# Patient Record
Sex: FEMALE | Race: WHITE | NOT HISPANIC OR LATINO | Employment: FULL TIME | URBAN - METROPOLITAN AREA
[De-identification: names, ages, dates, MRNs, and addresses within clinical notes are randomized per-mention and may not be internally consistent; named-entity substitution may affect disease eponyms.]

---

## 2020-05-12 ENCOUNTER — HOSPITAL ENCOUNTER (OUTPATIENT)
Dept: CT IMAGING | Age: 35
Discharge: HOME | End: 2020-05-12
Attending: OTOLARYNGOLOGY

## 2020-05-12 DIAGNOSIS — J32.0 CHRONIC MAXILLARY SINUSITIS: ICD-10-CM

## 2020-05-12 PROCEDURE — 70486 CT MAXILLOFACIAL W/O DYE: CPT

## 2020-05-22 ENCOUNTER — OFFICE VISIT (OUTPATIENT)
Dept: SLEEP MEDICINE | Age: 35
End: 2020-05-22

## 2020-05-22 DIAGNOSIS — G47.33 OSA (OBSTRUCTIVE SLEEP APNEA): ICD-10-CM

## 2020-05-22 PROCEDURE — 95806 SLEEP STUDY UNATT&RESP EFFT: CPT

## 2020-07-23 DIAGNOSIS — J32.0 CHRONIC MAXILLARY SINUSITIS: Primary | ICD-10-CM

## 2020-08-07 RX ORDER — NORGESTIMATE AND ETHINYL ESTRADIOL 7DAYSX3 LO
1 KIT ORAL DAILY
COMMUNITY

## 2020-08-07 ASSESSMENT — ACTIVITIES OF DAILY LIVING (ADL)
ADL_SCORE: 12
ADL_BEFORE_ADMISSION: INDEPENDENT

## 2020-08-09 ENCOUNTER — LAB SERVICES (OUTPATIENT)
Dept: LAB | Age: 35
End: 2020-08-09

## 2020-08-09 DIAGNOSIS — J32.0 CHRONIC MAXILLARY SINUSITIS: ICD-10-CM

## 2020-08-09 PROCEDURE — 87635 SARS-COV-2 COVID-19 AMP PRB: CPT | Performed by: FAMILY MEDICINE

## 2020-08-10 ENCOUNTER — ANESTHESIA EVENT (OUTPATIENT)
Dept: SURGERY | Age: 35
End: 2020-08-10

## 2020-08-10 LAB
SARS-COV-2 RNA RESP QL NAA+PROBE: NOT DETECTED
SERVICE CMNT-IMP: NORMAL
SPECIMEN SOURCE: NORMAL

## 2020-08-11 ENCOUNTER — ANESTHESIA (OUTPATIENT)
Dept: SURGERY | Age: 35
End: 2020-08-11

## 2020-08-11 ENCOUNTER — HOSPITAL ENCOUNTER (OUTPATIENT)
Age: 35
Discharge: HOME | End: 2020-08-11
Attending: OTOLARYNGOLOGY | Admitting: OTOLARYNGOLOGY

## 2020-08-11 VITALS
DIASTOLIC BLOOD PRESSURE: 80 MMHG | OXYGEN SATURATION: 98 % | HEART RATE: 70 BPM | RESPIRATION RATE: 18 BRPM | TEMPERATURE: 98.4 F | BODY MASS INDEX: 41.41 KG/M2 | HEIGHT: 63 IN | SYSTOLIC BLOOD PRESSURE: 151 MMHG | WEIGHT: 233.69 LBS

## 2020-08-11 LAB
B-HCG UR QL: NEGATIVE
GLUCOSE BLDC GLUCOMTR-MCNC: 152 MG/DL (ref 70–99)

## 2020-08-11 PROCEDURE — 13000002 HB ANESTHESIA  GENERAL  S/U + 1ST 15 MIN: Performed by: OTOLARYNGOLOGY

## 2020-08-11 PROCEDURE — 10004651 HB RX, NO CHARGE ITEM: Performed by: OTOLARYNGOLOGY

## 2020-08-11 PROCEDURE — 13000003 HB ANESTHESIA  GENERAL EA ADD MINUTE: Performed by: OTOLARYNGOLOGY

## 2020-08-11 PROCEDURE — 10004451 HB PACU RECOVERY 1ST 30 MINUTES: Performed by: OTOLARYNGOLOGY

## 2020-08-11 PROCEDURE — 10002803 HB RX 637

## 2020-08-11 PROCEDURE — 13000001 HB PHASE II RECOVERY EA 30 MINUTES: Performed by: OTOLARYNGOLOGY

## 2020-08-11 PROCEDURE — 81025 URINE PREGNANCY TEST: CPT

## 2020-08-11 PROCEDURE — 10006023 HB SUPPLY 272: Performed by: OTOLARYNGOLOGY

## 2020-08-11 PROCEDURE — 10002800 HB RX 250 W HCPCS: Performed by: ANESTHESIOLOGY

## 2020-08-11 PROCEDURE — 10002803 HB RX 637: Performed by: OTOLARYNGOLOGY

## 2020-08-11 PROCEDURE — 10002801 HB RX 250 W/O HCPCS: Performed by: ANESTHESIOLOGY

## 2020-08-11 PROCEDURE — 10002800 HB RX 250 W HCPCS: Performed by: OTOLARYNGOLOGY

## 2020-08-11 PROCEDURE — 88305 TISSUE EXAM BY PATHOLOGIST: CPT

## 2020-08-11 PROCEDURE — 88304 TISSUE EXAM BY PATHOLOGIST: CPT

## 2020-08-11 PROCEDURE — 10002807 HB RX 258

## 2020-08-11 PROCEDURE — 82962 GLUCOSE BLOOD TEST: CPT

## 2020-08-11 PROCEDURE — 13000037 HB COMPLEX CASE EACH ADD MINUTE: Performed by: OTOLARYNGOLOGY

## 2020-08-11 PROCEDURE — 13000036 HB COMPLEX  CASE S/U + 1ST 15 MIN: Performed by: OTOLARYNGOLOGY

## 2020-08-11 PROCEDURE — 10004452 HB PACU ADDL 30 MINUTES: Performed by: OTOLARYNGOLOGY

## 2020-08-11 PROCEDURE — 10002801 HB RX 250 W/O HCPCS: Performed by: OTOLARYNGOLOGY

## 2020-08-11 RX ORDER — MIDAZOLAM HYDROCHLORIDE 1 MG/ML
2 INJECTION, SOLUTION INTRAMUSCULAR; INTRAVENOUS ONCE
Status: DISCONTINUED | OUTPATIENT
Start: 2020-08-11 | End: 2020-08-11 | Stop reason: HOSPADM

## 2020-08-11 RX ORDER — MIDAZOLAM HYDROCHLORIDE 1 MG/ML
1 INJECTION, SOLUTION INTRAMUSCULAR; INTRAVENOUS ONCE
Status: COMPLETED | OUTPATIENT
Start: 2020-08-11 | End: 2020-08-11

## 2020-08-11 RX ORDER — CHLORHEXIDINE GLUCONATE ORAL RINSE 1.2 MG/ML
15 SOLUTION DENTAL
Status: COMPLETED | OUTPATIENT
Start: 2020-08-11 | End: 2020-08-11

## 2020-08-11 RX ORDER — ROCURONIUM BROMIDE 10 MG/ML
INJECTION, SOLUTION INTRAVENOUS PRN
Status: DISCONTINUED | OUTPATIENT
Start: 2020-08-11 | End: 2020-08-11

## 2020-08-11 RX ORDER — DEXTROSE MONOHYDRATE 25 G/50ML
25 INJECTION, SOLUTION INTRAVENOUS PRN
Status: DISCONTINUED | OUTPATIENT
Start: 2020-08-11 | End: 2020-08-11 | Stop reason: HOSPADM

## 2020-08-11 RX ORDER — 0.9 % SODIUM CHLORIDE 0.9 %
2 VIAL (ML) INJECTION EVERY 12 HOURS SCHEDULED
Status: DISCONTINUED | OUTPATIENT
Start: 2020-08-11 | End: 2020-08-11 | Stop reason: HOSPADM

## 2020-08-11 RX ORDER — PROPOFOL 10 MG/ML
INJECTION, EMULSION INTRAVENOUS PRN
Status: DISCONTINUED | OUTPATIENT
Start: 2020-08-11 | End: 2020-08-11

## 2020-08-11 RX ORDER — METOCLOPRAMIDE HYDROCHLORIDE 5 MG/ML
5 INJECTION INTRAMUSCULAR; INTRAVENOUS EVERY 6 HOURS PRN
Status: DISCONTINUED | OUTPATIENT
Start: 2020-08-11 | End: 2020-08-11 | Stop reason: HOSPADM

## 2020-08-11 RX ORDER — HUMAN INSULIN 100 [IU]/ML
INJECTION, SOLUTION SUBCUTANEOUS
Status: DISCONTINUED | OUTPATIENT
Start: 2020-08-11 | End: 2020-08-11 | Stop reason: HOSPADM

## 2020-08-11 RX ORDER — SODIUM CHLORIDE, SODIUM LACTATE, POTASSIUM CHLORIDE, CALCIUM CHLORIDE 600; 310; 30; 20 MG/100ML; MG/100ML; MG/100ML; MG/100ML
INJECTION, SOLUTION INTRAVENOUS CONTINUOUS
Status: DISCONTINUED | OUTPATIENT
Start: 2020-08-11 | End: 2020-08-11 | Stop reason: HOSPADM

## 2020-08-11 RX ORDER — GLYCOPYRROLATE 0.2 MG/ML
INJECTION, SOLUTION INTRAMUSCULAR; INTRAVENOUS PRN
Status: DISCONTINUED | OUTPATIENT
Start: 2020-08-11 | End: 2020-08-11

## 2020-08-11 RX ORDER — FAMOTIDINE 20 MG/1
20 TABLET, FILM COATED ORAL
Status: COMPLETED | OUTPATIENT
Start: 2020-08-11 | End: 2020-08-11

## 2020-08-11 RX ORDER — GABAPENTIN 300 MG/1
600 CAPSULE ORAL
Status: COMPLETED | OUTPATIENT
Start: 2020-08-11 | End: 2020-08-11

## 2020-08-11 RX ORDER — LIDOCAINE HYDROCHLORIDE 10 MG/ML
5-10 INJECTION, SOLUTION INFILTRATION; PERINEURAL PRN
Status: DISCONTINUED | OUTPATIENT
Start: 2020-08-11 | End: 2020-08-11 | Stop reason: HOSPADM

## 2020-08-11 RX ORDER — ONDANSETRON 2 MG/ML
INJECTION INTRAMUSCULAR; INTRAVENOUS PRN
Status: DISCONTINUED | OUTPATIENT
Start: 2020-08-11 | End: 2020-08-11

## 2020-08-11 RX ORDER — OXYMETAZOLINE HYDROCHLORIDE 0.05 G/100ML
SPRAY NASAL PRN
Status: DISCONTINUED | OUTPATIENT
Start: 2020-08-11 | End: 2020-08-11 | Stop reason: HOSPADM

## 2020-08-11 RX ORDER — HYDROCODONE BITARTRATE AND ACETAMINOPHEN 7.5; 325 MG/1; MG/1
1 TABLET ORAL EVERY 6 HOURS PRN
Status: DISCONTINUED | OUTPATIENT
Start: 2020-08-11 | End: 2020-08-11 | Stop reason: HOSPADM

## 2020-08-11 RX ORDER — NEOSTIGMINE METHYLSULFATE 1 MG/ML
INJECTION, SOLUTION INTRAVENOUS PRN
Status: DISCONTINUED | OUTPATIENT
Start: 2020-08-11 | End: 2020-08-11

## 2020-08-11 RX ORDER — LIDOCAINE HYDROCHLORIDE AND EPINEPHRINE 10; 10 MG/ML; UG/ML
INJECTION, SOLUTION INFILTRATION; PERINEURAL PRN
Status: DISCONTINUED | OUTPATIENT
Start: 2020-08-11 | End: 2020-08-11 | Stop reason: HOSPADM

## 2020-08-11 RX ORDER — MIDAZOLAM HYDROCHLORIDE 2 MG/2ML
INJECTION, SOLUTION INTRAMUSCULAR; INTRAVENOUS
Status: DISCONTINUED
Start: 2020-08-11 | End: 2020-08-11 | Stop reason: HOSPADM

## 2020-08-11 RX ORDER — DEXAMETHASONE SODIUM PHOSPHATE 4 MG/ML
INJECTION, SOLUTION INTRA-ARTICULAR; INTRALESIONAL; INTRAMUSCULAR; INTRAVENOUS; SOFT TISSUE PRN
Status: DISCONTINUED | OUTPATIENT
Start: 2020-08-11 | End: 2020-08-11

## 2020-08-11 RX ORDER — ONDANSETRON 2 MG/ML
4 INJECTION INTRAMUSCULAR; INTRAVENOUS 2 TIMES DAILY PRN
Status: DISCONTINUED | OUTPATIENT
Start: 2020-08-11 | End: 2020-08-11 | Stop reason: HOSPADM

## 2020-08-11 RX ORDER — ACETAMINOPHEN 500 MG
1000 TABLET ORAL
Status: COMPLETED | OUTPATIENT
Start: 2020-08-11 | End: 2020-08-11

## 2020-08-11 RX ORDER — HYDRALAZINE HYDROCHLORIDE 20 MG/ML
5 INJECTION INTRAMUSCULAR; INTRAVENOUS EVERY 10 MIN PRN
Status: DISCONTINUED | OUTPATIENT
Start: 2020-08-11 | End: 2020-08-11 | Stop reason: HOSPADM

## 2020-08-11 RX ORDER — MIDAZOLAM HYDROCHLORIDE 1 MG/ML
INJECTION, SOLUTION INTRAMUSCULAR; INTRAVENOUS PRN
Status: DISCONTINUED | OUTPATIENT
Start: 2020-08-11 | End: 2020-08-11

## 2020-08-11 RX ADMIN — ONDANSETRON HYDROCHLORIDE 4 MG: 2 SOLUTION INTRAMUSCULAR; INTRAVENOUS at 10:10

## 2020-08-11 RX ADMIN — LABETALOL HYDROCHLORIDE 5 MG: 5 INJECTION, SOLUTION INTRAVENOUS at 08:08

## 2020-08-11 RX ADMIN — MIDAZOLAM HYDROCHLORIDE 2 MG: 1 INJECTION, SOLUTION INTRAMUSCULAR; INTRAVENOUS at 07:38

## 2020-08-11 RX ADMIN — GLYCOPYRROLATE 0.6 MG: 0.2 INJECTION, SOLUTION INTRAMUSCULAR; INTRAVENOUS at 09:02

## 2020-08-11 RX ADMIN — CHLORHEXIDINE GLUCONATE 15 ML: 1.2 RINSE ORAL at 07:31

## 2020-08-11 RX ADMIN — FAMOTIDINE 20 MG: 20 TABLET ORAL at 07:22

## 2020-08-11 RX ADMIN — HYDROMORPHONE HYDROCHLORIDE 1 MG: 1 INJECTION, SOLUTION INTRAMUSCULAR; INTRAVENOUS; SUBCUTANEOUS at 08:08

## 2020-08-11 RX ADMIN — ONDANSETRON 4 MG: 2 INJECTION INTRAMUSCULAR; INTRAVENOUS at 07:56

## 2020-08-11 RX ADMIN — ACETAMINOPHEN 1000 MG: 500 TABLET ORAL at 07:22

## 2020-08-11 RX ADMIN — GABAPENTIN 600 MG: 300 CAPSULE ORAL at 07:22

## 2020-08-11 RX ADMIN — CEFAZOLIN SODIUM 2000 MG: 300 INJECTION, POWDER, LYOPHILIZED, FOR SOLUTION INTRAVENOUS at 07:53

## 2020-08-11 RX ADMIN — PROPOFOL 200 MG: 10 INJECTION, EMULSION INTRAVENOUS at 07:44

## 2020-08-11 RX ADMIN — SODIUM CHLORIDE, SODIUM LACTATE, POTASSIUM CHLORIDE, AND CALCIUM CHLORIDE: .6; .31; .03; .02 INJECTION, SOLUTION INTRAVENOUS at 07:22

## 2020-08-11 RX ADMIN — DEXAMETHASONE SODIUM PHOSPHATE 10 MG: 4 INJECTION, SOLUTION INTRAMUSCULAR; INTRAVENOUS at 07:56

## 2020-08-11 RX ADMIN — ROCURONIUM BROMIDE 50 MG: 50 INJECTION, SOLUTION INTRAVENOUS at 07:44

## 2020-08-11 RX ADMIN — FENTANYL CITRATE 100 MCG: 50 INJECTION INTRAMUSCULAR; INTRAVENOUS at 07:38

## 2020-08-11 RX ADMIN — NEOSTIGMINE METHYLSULFATE 3 MG: 1 INJECTION INTRAVENOUS at 09:02

## 2020-08-11 RX ADMIN — MIDAZOLAM HYDROCHLORIDE 1 MG: 1 INJECTION, SOLUTION INTRAMUSCULAR; INTRAVENOUS at 09:20

## 2020-08-11 ASSESSMENT — PAIN SCALES - GENERAL
PAINLEVEL_OUTOF10: 0
PAINLEVEL_OUTOF10: 4
PAINLEVEL_OUTOF10: 3

## 2020-08-13 LAB — PATHOLOGIST NAME: NORMAL

## 2020-12-07 ENCOUNTER — HOSPITAL ENCOUNTER (OUTPATIENT)
Dept: LAB | Age: 35
Discharge: HOME | End: 2020-12-07
Attending: OTOLARYNGOLOGY

## 2020-12-07 DIAGNOSIS — R43.8 HYPOSMIA: Primary | ICD-10-CM

## 2020-12-07 PROCEDURE — 86769 SARS-COV-2 COVID-19 ANTIBODY: CPT | Performed by: OTOLARYNGOLOGY

## 2020-12-07 PROCEDURE — 36415 COLL VENOUS BLD VENIPUNCTURE: CPT | Performed by: OTOLARYNGOLOGY

## 2020-12-08 LAB
SARS-COV-2 IGG SERPL QL IA: POSITIVE
SARS-COV-2 N IGG SERPL QL IA: 3.69

## 2022-01-14 ENCOUNTER — HOSPITAL ENCOUNTER (OUTPATIENT)
Dept: GENERAL RADIOLOGY | Age: 37
Discharge: HOME | End: 2022-01-14

## 2022-01-14 DIAGNOSIS — R05.9 COUGH: ICD-10-CM

## 2022-01-14 PROCEDURE — 71046 X-RAY EXAM CHEST 2 VIEWS: CPT

## 2022-08-24 ENCOUNTER — HOSPITAL ENCOUNTER (OUTPATIENT)
Dept: CT IMAGING | Age: 37
Discharge: HOME | End: 2022-08-24
Attending: OTOLARYNGOLOGY

## 2022-08-24 DIAGNOSIS — M54.2 NECK PAIN ON LEFT SIDE: ICD-10-CM

## 2022-08-24 PROCEDURE — 10002805 HB CONTRAST AGENT: Performed by: OTOLARYNGOLOGY

## 2022-08-24 PROCEDURE — 70492 CT SFT TSUE NCK W/O & W/DYE: CPT

## 2022-08-24 RX ADMIN — IOHEXOL 100 ML: 350 INJECTION, SOLUTION INTRAVENOUS at 08:51

## 2022-09-12 ENCOUNTER — OFFICE VISIT (OUTPATIENT)
Dept: INTERNAL MEDICINE CLINIC | Facility: CLINIC | Age: 37
End: 2022-09-12
Payer: COMMERCIAL

## 2022-09-12 VITALS
BODY MASS INDEX: 43.94 KG/M2 | SYSTOLIC BLOOD PRESSURE: 120 MMHG | DIASTOLIC BLOOD PRESSURE: 80 MMHG | WEIGHT: 248 LBS | HEIGHT: 63 IN

## 2022-09-12 DIAGNOSIS — B37.3 YEAST INFECTION INVOLVING THE VAGINA AND SURROUNDING AREA: ICD-10-CM

## 2022-09-12 DIAGNOSIS — Z00.00 WELLNESS EXAMINATION: Primary | ICD-10-CM

## 2022-09-12 PROCEDURE — 3079F DIAST BP 80-89 MM HG: CPT | Performed by: INTERNAL MEDICINE

## 2022-09-12 PROCEDURE — 3008F BODY MASS INDEX DOCD: CPT | Performed by: INTERNAL MEDICINE

## 2022-09-12 PROCEDURE — 99385 PREV VISIT NEW AGE 18-39: CPT | Performed by: INTERNAL MEDICINE

## 2022-09-12 PROCEDURE — 3074F SYST BP LT 130 MM HG: CPT | Performed by: INTERNAL MEDICINE

## 2022-09-12 RX ORDER — NORGESTIMATE AND ETHINYL ESTRADIOL 7DAYSX3 LO
1 KIT ORAL DAILY
COMMUNITY

## 2022-09-12 RX ORDER — NORETHINDRONE ACETATE AND ETHINYL ESTRADIOL, ETHINYL ESTRADIOL AND FERROUS FUMARATE 1MG-10(24)
1 KIT ORAL DAILY
COMMUNITY
Start: 2022-07-14

## 2022-10-22 ENCOUNTER — NURSE ONLY (OUTPATIENT)
Dept: INTERNAL MEDICINE CLINIC | Facility: CLINIC | Age: 37
End: 2022-10-22
Payer: COMMERCIAL

## 2022-10-23 LAB
ABSOLUTE BASOPHILS: 41 CELLS/UL (ref 0–200)
ABSOLUTE EOSINOPHILS: 41 CELLS/UL (ref 15–500)
ABSOLUTE LYMPHOCYTES: 1949 CELLS/UL (ref 850–3900)
ABSOLUTE MONOCYTES: 325 CELLS/UL (ref 200–950)
ABSOLUTE NEUTROPHILS: 3445 CELLS/UL (ref 1500–7800)
ALBUMIN/GLOBULIN RATIO: 1.4 (CALC) (ref 1–2.5)
ALBUMIN: 4 G/DL (ref 3.6–5.1)
ALKALINE PHOSPHATASE: 56 U/L (ref 31–125)
ALT: 68 U/L (ref 6–29)
AST: 39 U/L (ref 10–30)
BASOPHILS: 0.7 %
BILIRUBIN, TOTAL: 0.2 MG/DL (ref 0.2–1.2)
BUN: 14 MG/DL (ref 7–25)
CALCIUM: 9.3 MG/DL (ref 8.6–10.2)
CARBON DIOXIDE: 22 MMOL/L (ref 20–32)
CHLORIDE: 107 MMOL/L (ref 98–110)
CHOL/HDLC RATIO: 3.9 (CALC)
CHOLESTEROL, TOTAL: 210 MG/DL
CREATININE: 0.62 MG/DL (ref 0.5–0.97)
EGFR: 118 ML/MIN/1.73M2
EOSINOPHILS: 0.7 %
GLOBULIN: 2.9 G/DL (CALC) (ref 1.9–3.7)
GLUCOSE: 134 MG/DL (ref 65–99)
HDL CHOLESTEROL: 54 MG/DL
HEMATOCRIT: 44 % (ref 35–45)
HEMOGLOBIN A1C: 5.5 % OF TOTAL HGB
HEMOGLOBIN: 14.3 G/DL (ref 11.7–15.5)
LDL-CHOLESTEROL: 122 MG/DL (CALC)
LYMPHOCYTES: 33.6 %
MCH: 30 PG (ref 27–33)
MCHC: 32.5 G/DL (ref 32–36)
MCV: 92.4 FL (ref 80–100)
MONOCYTES: 5.6 %
MPV: 9.9 FL (ref 7.5–12.5)
NEUTROPHILS: 59.4 %
NON-HDL CHOLESTEROL: 156 MG/DL (CALC)
PLATELET COUNT: 264 THOUSAND/UL (ref 140–400)
POTASSIUM: 4.4 MMOL/L (ref 3.5–5.3)
PROTEIN, TOTAL: 6.9 G/DL (ref 6.1–8.1)
RDW: 12.6 % (ref 11–15)
RED BLOOD CELL COUNT: 4.76 MILLION/UL (ref 3.8–5.1)
SODIUM: 138 MMOL/L (ref 135–146)
TRIGLYCERIDES: 225 MG/DL
WHITE BLOOD CELL COUNT: 5.8 THOUSAND/UL (ref 3.8–10.8)

## 2022-10-24 NOTE — PROGRESS NOTES
Confirmed  & full name, Pt was drawn today for   LIPID, CBC, A1C, CMP     Tolerated blood draw well.     Maxine Torres

## 2023-04-10 ENCOUNTER — OFFICE VISIT (OUTPATIENT)
Dept: FAMILY MEDICINE CLINIC | Facility: CLINIC | Age: 38
End: 2023-04-10

## 2023-04-10 ENCOUNTER — HOSPITAL ENCOUNTER (EMERGENCY)
Facility: HOSPITAL | Age: 38
Discharge: HOME | End: 2023-04-10
Attending: EMERGENCY MEDICINE
Payer: COMMERCIAL

## 2023-04-10 VITALS
HEIGHT: 63 IN | RESPIRATION RATE: 18 BRPM | TEMPERATURE: 99 F | HEART RATE: 109 BPM | BODY MASS INDEX: 43.59 KG/M2 | DIASTOLIC BLOOD PRESSURE: 100 MMHG | SYSTOLIC BLOOD PRESSURE: 160 MMHG | OXYGEN SATURATION: 99 % | WEIGHT: 246 LBS

## 2023-04-10 VITALS
TEMPERATURE: 98 F | DIASTOLIC BLOOD PRESSURE: 85 MMHG | OXYGEN SATURATION: 98 % | WEIGHT: 246 LBS | HEIGHT: 63 IN | BODY MASS INDEX: 43.59 KG/M2 | HEART RATE: 80 BPM | SYSTOLIC BLOOD PRESSURE: 129 MMHG | RESPIRATION RATE: 25 BRPM

## 2023-04-10 DIAGNOSIS — Z02.9 ADMINISTRATIVE ENCOUNTER: ICD-10-CM

## 2023-04-10 DIAGNOSIS — R03.0 ELEVATED BLOOD PRESSURE READING: ICD-10-CM

## 2023-04-10 DIAGNOSIS — Z01.30 BLOOD PRESSURE CHECK: Primary | ICD-10-CM

## 2023-04-10 DIAGNOSIS — R03.0 ELEVATED BP WITHOUT DIAGNOSIS OF HYPERTENSION: Primary | ICD-10-CM

## 2023-04-10 LAB
ANION GAP SERPL CALC-SCNC: 8 MMOL/L (ref 0–18)
B-HCG UR QL: NEGATIVE
BASOPHILS # BLD AUTO: 0.04 X10(3) UL (ref 0–0.2)
BASOPHILS NFR BLD AUTO: 0.6 %
BUN BLD-MCNC: 10 MG/DL (ref 7–18)
BUN/CREAT SERPL: 13.7 (ref 10–20)
CALCIUM BLD-MCNC: 8.7 MG/DL (ref 8.5–10.1)
CHLORIDE SERPL-SCNC: 112 MMOL/L (ref 98–112)
CO2 SERPL-SCNC: 18 MMOL/L (ref 21–32)
CREAT BLD-MCNC: 0.73 MG/DL
D DIMER PPP FEU-MCNC: <0.27 UG/ML FEU (ref ?–0.5)
DEPRECATED RDW RBC AUTO: 41.8 FL (ref 35.1–46.3)
EOSINOPHIL # BLD AUTO: 0.01 X10(3) UL (ref 0–0.7)
EOSINOPHIL NFR BLD AUTO: 0.1 %
ERYTHROCYTE [DISTWIDTH] IN BLOOD BY AUTOMATED COUNT: 12.8 % (ref 11–15)
GFR SERPLBLD BASED ON 1.73 SQ M-ARVRAT: 108 ML/MIN/1.73M2 (ref 60–?)
GLUCOSE BLD-MCNC: 125 MG/DL (ref 70–99)
HCT VFR BLD AUTO: 44 %
HGB BLD-MCNC: 14.5 G/DL
IMM GRANULOCYTES # BLD AUTO: 0.02 X10(3) UL (ref 0–1)
IMM GRANULOCYTES NFR BLD: 0.3 %
LYMPHOCYTES # BLD AUTO: 1.87 X10(3) UL (ref 1–4)
LYMPHOCYTES NFR BLD AUTO: 26.9 %
MAGNESIUM SERPL-MCNC: 2.2 MG/DL (ref 1.6–2.6)
MCH RBC QN AUTO: 29.2 PG (ref 26–34)
MCHC RBC AUTO-ENTMCNC: 33 G/DL (ref 31–37)
MCV RBC AUTO: 88.5 FL
MONOCYTES # BLD AUTO: 0.37 X10(3) UL (ref 0.1–1)
MONOCYTES NFR BLD AUTO: 5.3 %
NEUTROPHILS # BLD AUTO: 4.65 X10 (3) UL (ref 1.5–7.7)
NEUTROPHILS # BLD AUTO: 4.65 X10(3) UL (ref 1.5–7.7)
NEUTROPHILS NFR BLD AUTO: 66.8 %
OSMOLALITY SERPL CALC.SUM OF ELEC: 287 MOSM/KG (ref 275–295)
PLATELET # BLD AUTO: 284 10(3)UL (ref 150–450)
POTASSIUM SERPL-SCNC: 4.4 MMOL/L (ref 3.5–5.1)
RBC # BLD AUTO: 4.97 X10(6)UL
SODIUM SERPL-SCNC: 138 MMOL/L (ref 136–145)
TROPONIN I HIGH SENSITIVITY: 5 NG/L
WBC # BLD AUTO: 7 X10(3) UL (ref 4–11)

## 2023-04-10 PROCEDURE — 99284 EMERGENCY DEPT VISIT MOD MDM: CPT

## 2023-04-10 PROCEDURE — 85025 COMPLETE CBC W/AUTO DIFF WBC: CPT | Performed by: EMERGENCY MEDICINE

## 2023-04-10 PROCEDURE — 93010 ELECTROCARDIOGRAM REPORT: CPT

## 2023-04-10 PROCEDURE — 80048 BASIC METABOLIC PNL TOTAL CA: CPT

## 2023-04-10 PROCEDURE — 84484 ASSAY OF TROPONIN QUANT: CPT | Performed by: EMERGENCY MEDICINE

## 2023-04-10 PROCEDURE — 84484 ASSAY OF TROPONIN QUANT: CPT

## 2023-04-10 PROCEDURE — 85025 COMPLETE CBC W/AUTO DIFF WBC: CPT

## 2023-04-10 PROCEDURE — 83735 ASSAY OF MAGNESIUM: CPT | Performed by: EMERGENCY MEDICINE

## 2023-04-10 PROCEDURE — 96360 HYDRATION IV INFUSION INIT: CPT

## 2023-04-10 PROCEDURE — 81025 URINE PREGNANCY TEST: CPT

## 2023-04-10 PROCEDURE — 85379 FIBRIN DEGRADATION QUANT: CPT | Performed by: EMERGENCY MEDICINE

## 2023-04-10 PROCEDURE — 96361 HYDRATE IV INFUSION ADD-ON: CPT

## 2023-04-10 PROCEDURE — 93005 ELECTROCARDIOGRAM TRACING: CPT

## 2023-04-10 PROCEDURE — 80048 BASIC METABOLIC PNL TOTAL CA: CPT | Performed by: EMERGENCY MEDICINE

## 2023-04-10 RX ORDER — ACETAMINOPHEN 500 MG
1000 TABLET ORAL ONCE
Status: COMPLETED | OUTPATIENT
Start: 2023-04-10 | End: 2023-04-10

## 2023-04-10 RX ORDER — LABETALOL HYDROCHLORIDE 5 MG/ML
20 INJECTION, SOLUTION INTRAVENOUS ONCE
Status: DISCONTINUED | OUTPATIENT
Start: 2023-04-10 | End: 2023-04-10

## 2023-04-10 RX ORDER — NORGESTREL AND ETHINYL ESTRADIOL 0.3-0.03MG
1 KIT ORAL DAILY
COMMUNITY
Start: 2023-03-15

## 2023-04-10 NOTE — ED INITIAL ASSESSMENT (HPI)
Patient arrives ambulatory through triage with complaints of high blood pressure, heart palpitations for a couple weeks, worsening within the last week. Patient reports increase in migraine episodes. Cortisone shot in her foot last Tuesday.

## 2023-04-11 LAB
ATRIAL RATE: 101 BPM
P AXIS: 20 DEGREES
P-R INTERVAL: 164 MS
Q-T INTERVAL: 346 MS
QRS DURATION: 76 MS
QTC CALCULATION (BEZET): 448 MS
R AXIS: 40 DEGREES
T AXIS: 41 DEGREES
VENTRICULAR RATE: 101 BPM

## 2023-09-15 ENCOUNTER — OFFICE VISIT (OUTPATIENT)
Dept: INTERNAL MEDICINE CLINIC | Facility: CLINIC | Age: 38
End: 2023-09-15
Payer: COMMERCIAL

## 2023-09-15 VITALS
DIASTOLIC BLOOD PRESSURE: 88 MMHG | HEART RATE: 96 BPM | BODY MASS INDEX: 40.4 KG/M2 | TEMPERATURE: 98 F | SYSTOLIC BLOOD PRESSURE: 124 MMHG | OXYGEN SATURATION: 98 % | WEIGHT: 228 LBS | HEIGHT: 63 IN

## 2023-09-15 DIAGNOSIS — G43.009 MIGRAINE WITHOUT AURA AND WITHOUT STATUS MIGRAINOSUS, NOT INTRACTABLE: Primary | ICD-10-CM

## 2023-09-15 DIAGNOSIS — F41.0 PANIC DISORDER: ICD-10-CM

## 2023-09-15 PROCEDURE — 3074F SYST BP LT 130 MM HG: CPT | Performed by: INTERNAL MEDICINE

## 2023-09-15 PROCEDURE — 3079F DIAST BP 80-89 MM HG: CPT | Performed by: INTERNAL MEDICINE

## 2023-09-15 PROCEDURE — 99214 OFFICE O/P EST MOD 30 MIN: CPT | Performed by: INTERNAL MEDICINE

## 2023-09-15 PROCEDURE — 3008F BODY MASS INDEX DOCD: CPT | Performed by: INTERNAL MEDICINE

## 2023-09-15 RX ORDER — ELETRIPTAN HYDROBROMIDE 40 MG/1
40 TABLET, FILM COATED ORAL AS NEEDED
Qty: 12 TABLET | Refills: 5 | Status: SHIPPED | OUTPATIENT
Start: 2023-09-15

## 2023-09-15 RX ORDER — LORAZEPAM 0.5 MG/1
0.5 TABLET ORAL EVERY 6 HOURS PRN
Qty: 30 TABLET | Refills: 1 | Status: SHIPPED | OUTPATIENT
Start: 2023-09-15

## 2024-01-24 RX ORDER — ELETRIPTAN HYDROBROMIDE 40 MG/1
40 TABLET, FILM COATED ORAL AS NEEDED
Qty: 12 TABLET | Refills: 5 | Status: SHIPPED | OUTPATIENT
Start: 2024-01-24

## 2024-01-24 NOTE — TELEPHONE ENCOUNTER
A refill request was received for:  Requested Prescriptions     Pending Prescriptions Disp Refills    Eletriptan Hydrobromide 40 MG Oral Tab 12 tablet 5     Sig: Take 1 tablet (40 mg total) by mouth as needed.     Last refill date:  9/15/23    Last office visit: 9/15/23      No future appointments.

## 2024-07-09 ENCOUNTER — OFFICE VISIT (OUTPATIENT)
Dept: INTERNAL MEDICINE CLINIC | Facility: CLINIC | Age: 39
End: 2024-07-09
Payer: COMMERCIAL

## 2024-07-09 VITALS
BODY MASS INDEX: 40.57 KG/M2 | HEART RATE: 97 BPM | DIASTOLIC BLOOD PRESSURE: 100 MMHG | WEIGHT: 229 LBS | OXYGEN SATURATION: 98 % | SYSTOLIC BLOOD PRESSURE: 150 MMHG | HEIGHT: 63 IN

## 2024-07-09 DIAGNOSIS — I10 PRIMARY HYPERTENSION: ICD-10-CM

## 2024-07-09 DIAGNOSIS — Z00.00 WELLNESS EXAMINATION: Primary | ICD-10-CM

## 2024-07-09 DIAGNOSIS — G43.009 MIGRAINE WITHOUT AURA AND WITHOUT STATUS MIGRAINOSUS, NOT INTRACTABLE: ICD-10-CM

## 2024-07-09 PROCEDURE — 99395 PREV VISIT EST AGE 18-39: CPT | Performed by: INTERNAL MEDICINE

## 2024-07-09 RX ORDER — OLMESARTAN MEDOXOMIL AND HYDROCHLOROTHIAZIDE 20/12.5 20; 12.5 MG/1; MG/1
1 TABLET ORAL DAILY
Qty: 90 TABLET | Refills: 3 | Status: SHIPPED | OUTPATIENT
Start: 2024-07-09 | End: 2025-07-04

## 2024-07-09 NOTE — PROGRESS NOTES
Subjective:   Mecca Fitzgerald is a 39 year old female who presents for Physical     Patient here for a wellness examination and fu on elevated bp and frequent migraines.  Also has anxiety.  Has concerns about PCOS.  Is seeing a psychotherapist.  History/Other:    Chief Complaint Reviewed and Verified  No Further Nursing Notes to   Review  Medications Reviewed         Tobacco:  Non smoker      Current Outpatient Medications   Medication Sig Dispense Refill    Olmesartan Medoxomil-HCTZ 20-12.5 MG Oral Tab Take 1 tablet by mouth daily. 90 tablet 3    Eletriptan Hydrobromide 40 MG Oral Tab Take 1 tablet (40 mg total) by mouth as needed. 12 tablet 5    LORazepam 0.5 MG Oral Tab Take 1 tablet (0.5 mg total) by mouth every 6 (six) hours as needed for Anxiety. 30 tablet 1    LOW-OGESTREL 0.3-30 MG-MCG Oral Tab Take 1 tablet by mouth daily.           Review of Systems:  Review of Systems   Constitutional:  Positive for fatigue and unexpected weight change (gain).   Respiratory:  Positive for cough.    Cardiovascular:  Negative for chest pain, palpitations and leg swelling.   Gastrointestinal: Negative.    Endocrine: Negative.    Genitourinary: Negative.    Musculoskeletal: Negative.    Neurological:  Positive for headaches. Negative for numbness.   Psychiatric/Behavioral:  The patient is nervous/anxious.          Objective:   BP (!) 150/100   Pulse 97   Ht 5' 3\" (1.6 m)   Wt 229 lb (103.9 kg)   SpO2 98%   BMI 40.57 kg/m²  Estimated body mass index is 40.57 kg/m² as calculated from the following:    Height as of this encounter: 5' 3\" (1.6 m).    Weight as of this encounter: 229 lb (103.9 kg).  Physical Exam  Constitutional:       Appearance: She is obese.   HENT:      Head: Normocephalic and atraumatic.      Right Ear: Ear canal normal.      Left Ear: Ear canal normal.   Eyes:      General: No scleral icterus.     Pupils: Pupils are equal, round, and reactive to light.   Neck:      Vascular: No carotid bruit.    Cardiovascular:      Rate and Rhythm: Normal rate and regular rhythm.      Heart sounds: Normal heart sounds.   Pulmonary:      Effort: Pulmonary effort is normal.      Breath sounds: Normal breath sounds.   Abdominal:      Palpations: Abdomen is soft.      Tenderness: There is no abdominal tenderness.   Musculoskeletal:      Cervical back: Neck supple.      Right lower leg: No edema.      Left lower leg: No edema.   Neurological:      General: No focal deficit present.      Mental Status: She is alert.   Psychiatric:         Thought Content: Thought content normal.           Assessment & Plan:   1. Wellness examination (Primary) check fasting labs  2. Migraine without aura and without status migrainosus, not intractable will get MRI of brain and refer to neurology  -     MRI BRAIN (CPT=70551); Future; Expected date: 07/09/2024  -     Neuro Referral - JOSEPHINE ([facility])  3. Primary hypertension Start zsuvbau27/12.5 mg  Other orders  -     Olmesartan Medoxomil-HCTZ; Take 1 tablet by mouth daily.  Dispense: 90 tablet; Refill: 3        No follow-ups on file.    Vicky Rockwell MD, 7/9/2024, 2:34 PM

## 2024-07-16 LAB
ABSOLUTE BASOPHILS: 33 CELLS/UL (ref 0–200)
ABSOLUTE EOSINOPHILS: 59 CELLS/UL (ref 15–500)
ABSOLUTE LYMPHOCYTES: 1983 CELLS/UL (ref 850–3900)
ABSOLUTE MONOCYTES: 319 CELLS/UL (ref 200–950)
ABSOLUTE NEUTROPHILS: 4108 CELLS/UL (ref 1500–7800)
ALBUMIN/GLOBULIN RATIO: 1.3 (CALC) (ref 1–2.5)
ALBUMIN: 4.3 G/DL (ref 3.6–5.1)
ALKALINE PHOSPHATASE: 65 U/L (ref 31–125)
ALT: 34 U/L (ref 6–29)
AST: 24 U/L (ref 10–30)
BASOPHILS: 0.5 %
BILIRUBIN, TOTAL: 0.4 MG/DL (ref 0.2–1.2)
BUN: 12 MG/DL (ref 7–25)
CALCIUM: 9.2 MG/DL (ref 8.6–10.2)
CARBON DIOXIDE: 23 MMOL/L (ref 20–32)
CHLORIDE: 102 MMOL/L (ref 98–110)
CHOL/HDLC RATIO: 4.5 (CALC)
CHOLESTEROL, TOTAL: 236 MG/DL
CREATININE: 0.66 MG/DL (ref 0.5–0.97)
EGFR: 114 ML/MIN/1.73M2
EOSINOPHILS: 0.9 %
GLOBULIN: 3.2 G/DL (CALC) (ref 1.9–3.7)
GLUCOSE: 121 MG/DL (ref 65–99)
HDL CHOLESTEROL: 53 MG/DL
HEMATOCRIT: 46.5 % (ref 35–45)
HEMOGLOBIN: 15.1 G/DL (ref 11.7–15.5)
LDL-CHOLESTEROL: 144 MG/DL (CALC)
LYMPHOCYTES: 30.5 %
MCH: 30.4 PG (ref 27–33)
MCHC: 32.5 G/DL (ref 32–36)
MCV: 93.6 FL (ref 80–100)
MONOCYTES: 4.9 %
MPV: 9.5 FL (ref 7.5–12.5)
NEUTROPHILS: 63.2 %
NON-HDL CHOLESTEROL: 183 MG/DL (CALC)
PLATELET COUNT: 311 THOUSAND/UL (ref 140–400)
POTASSIUM: 4.2 MMOL/L (ref 3.5–5.3)
PROTEIN, TOTAL: 7.5 G/DL (ref 6.1–8.1)
RDW: 12.4 % (ref 11–15)
RED BLOOD CELL COUNT: 4.97 MILLION/UL (ref 3.8–5.1)
SODIUM: 136 MMOL/L (ref 135–146)
TESTOSTERONE, TOTAL,$/MS/MS: 20 NG/DL (ref 2–45)
TRIGLYCERIDES: 240 MG/DL
TSH W/REFLEX TO FT4: 2.09 MIU/L
WHITE BLOOD CELL COUNT: 6.5 THOUSAND/UL (ref 3.8–10.8)

## 2024-07-31 ENCOUNTER — HOSPITAL ENCOUNTER (OUTPATIENT)
Dept: MRI IMAGING | Age: 39
Discharge: HOME | End: 2024-07-31
Attending: INTERNAL MEDICINE
Payer: COMMERCIAL

## 2024-07-31 DIAGNOSIS — G43.009 MIGRAINE WITHOUT AURA AND WITHOUT STATUS MIGRAINOSUS, NOT INTRACTABLE: ICD-10-CM

## 2024-07-31 PROCEDURE — 70551 MRI BRAIN STEM W/O DYE: CPT | Performed by: INTERNAL MEDICINE

## 2024-08-06 ENCOUNTER — OFFICE VISIT (OUTPATIENT)
Dept: NEUROLOGY | Facility: CLINIC | Age: 39
End: 2024-08-06
Payer: COMMERCIAL

## 2024-08-06 VITALS — HEART RATE: 73 BPM | DIASTOLIC BLOOD PRESSURE: 85 MMHG | SYSTOLIC BLOOD PRESSURE: 143 MMHG

## 2024-08-06 DIAGNOSIS — G43.909 EPISODIC MIGRAINE: Primary | ICD-10-CM

## 2024-08-06 PROCEDURE — 99203 OFFICE O/P NEW LOW 30 MIN: CPT | Performed by: INTERNAL MEDICINE

## 2024-08-06 RX ORDER — SUMATRIPTAN 100 MG/1
TABLET, FILM COATED ORAL
Qty: 9 TABLET | Refills: 3 | Status: SHIPPED | OUTPATIENT
Start: 2024-08-06

## 2024-08-06 NOTE — PROGRESS NOTES
[facility] NEUROSCIENCES [address], [address]  [facility] [address]  [phone]            Neurology Initial Visit     Referred By: Dr. Rockwell    Chief Complaint:   Chief Complaint   Patient presents with    Migraine     Pt presents today referred by Dr Rockwell for migraines without since child. She has never seen neurology. She has multiple migraines monthly. She reports 4-5 last month. 2 were really bad lasting 2-5 days. Typically they only last about 1 day. + nausea/vomiting, + light & sound sensitivity. Has tried relpax. It does not always work. She tried OTC meds. Excedrin Migraine most effective.        HPI:     Mecca Fitzgerald is a 39 year old female with history of hypertension, who presents for evaluation of migraine.  Has had migraines since she was a child, characterized by mostly left-sided pain over the forehead, which will escalate to severe pain associated with nausea, vomiting, light sensitivity, sound sensitivity.  Has to go lay down in a dark room.  Right now having 4-5 migraines per month, but some migraines last several days in a row at times.  Frequency is very variable.  She has identified several food triggers and avoiding gluten, sugar, and coffee have been helpful in preventing migraines, but it is hard to avoid these at all times.  Sometimes has menstrual migraines but not always.  Currently taking oral birth control pills.  Has tried most over-the-counter agents in the past and found Excedrin to be somewhat helpful.  Also started on Eletriptan recently.  In the beginning, 1 tablet would abort the headache but over time she started to feel like it was not working.  Never tried 2 pills per headache.  Sleeps well at night.  No vision changes, numbness, paralysis during headaches.    Past Medical History:    Essential hypertension    Migraines       Past Surgical History:   Procedure Laterality Date    Other surgical history      Tonsillectomy          Social history:  History   Smoking Status    Never   Smokeless Tobacco    Never     History   Alcohol Use    Yes     History   Drug Use Unknown       Family History   Problem Relation Age of Onset    Diabetes Father     Diabetes Mother     Diabetes Maternal Grandmother     Diabetes Maternal Grandfather          Current Outpatient Medications:     SUMAtriptan Succinate 100 MG Oral Tab, Use at onset; repeat once after 2 HRS-ONLY 2 IN 24 HR MAX.  This is a 30 day supply., Disp: 9 tablet, Rfl: 3    Olmesartan Medoxomil-HCTZ 20-12.5 MG Oral Tab, Take 1 tablet by mouth daily., Disp: 90 tablet, Rfl: 3    LORazepam 0.5 MG Oral Tab, Take 1 tablet (0.5 mg total) by mouth every 6 (six) hours as needed for Anxiety., Disp: 30 tablet, Rfl: 1    LOW-OGESTREL 0.3-30 MG-MCG Oral Tab, Take 1 tablet by mouth daily., Disp: , Rfl:     No Known Allergies    ROS:   As in HPI, the rest of the 14 system review was done and was negative      Physical Exam:  Vitals:    08/06/24 1511   BP: 143/85   Pulse: 73       General: No apparent distress, well nourished, well groomed.  Head- Normocephalic, atraumatic  Eyes- No redness or swelling    Neurological:   Mental Status:  Mental Status- Alert, conversant, speech fluent, following all commands, Fund of knowledge appropriate for education and age, and Thought process intact    Cranial Nerves:  II.- Visual fields full to confrontation,       Fundoscopic Exam- Sharp optic discs, no pallor, III, IV, VI- EOM intact, PERRL, V. Facial sensation intact, and VII. Face symmetric, no facial weakness    Motor Exam:  Strength- upper extremities 5/5 proximally and distally                - lower  extremities 5/5 proximally and distally    Sensory Exam:  Pinprick- intact in all 4 extremities  Vibration- intact in all 4 extremities    Deep Tendon Reflexes:  Biceps 2+ bilateral symmetric  Triceps 2+ bilateral symmetric  Brachioradialis 2 + bilateral symmetric  Patellar 2+ bilateral symmetric  Ankle jerks 2+  bilateral symmetric    Coordination:  No dysmetria with finger to nose bilaterally    Gait:  Normal casual gait    Labs:    No results found for: \"TSH\"  Lab Results   Component Value Date    HDL 53 07/13/2024     (H) 07/13/2024    TRIG 240 (H) 07/13/2024     Lab Results   Component Value Date    HGB 15.1 07/13/2024    HCT 46.5 (H) 07/13/2024    MCV 93.6 07/13/2024    WBC 6.5 07/13/2024     07/13/2024      Lab Results   Component Value Date    BUN 12 07/13/2024    CA 9.2 07/13/2024    ALT 34 (H) 07/13/2024    AST 24 07/13/2024    ALB 4.3 07/13/2024     07/13/2024    K 4.2 07/13/2024     07/13/2024    CO2 23 07/13/2024      I have reviewed labs.    Imaging Studies:  I have independently reviewed imaging.  Few scattered T2 punctate foci hyperintensities, 1 larger one in the right frontal subcortical white matter      Assessment   Mecca Fitzgerald is a 39 year old female with history of hypertension, who presents for evaluation of episodic migraine.  Has had a minimal response to Eletriptan but it has not successfully aborted migraines, persistently.     1. Episodic migraine  -Start sumatriptan 100 mg, higher dose may be more effective at fully aborting the headache.  Can still repeat dose within 1 hour if needed, but then take no more abortive agents of any kind until 48 to 72 hours later.  -If this is ineffective, may consider Ubrelvy or Nurtec for abortive instead    2. Abnormal MRI  MRI brain shows a few foci of T2 hyperintensity which are likely related to underlying migraines.  However, patient does have hypertension as well.  Advised her to watch hypertension, and make sure it remains under control in case some of these white matter changes are related to high blood pressure       Education and counseling provided to patient. Instructed patient to call my office or seek medical attention immediately if symptoms worsen.  Patient verbalized understanding of information given. All  questions were answered. All side effects of drugs were discussed.     Time spent for examination, counseling and coordination of care such as potential treatment options- 34 minutes with more than 50% of the time spent counseling the patient.    Return to clinic in: Return in about 2 months (around 10/6/2024).    Delphine Melissa MD

## 2024-12-02 RX ORDER — SUMATRIPTAN SUCCINATE 100 MG/1
TABLET ORAL
Qty: 9 TABLET | Refills: 3 | Status: SHIPPED | OUTPATIENT
Start: 2024-12-02

## 2024-12-02 NOTE — TELEPHONE ENCOUNTER
Requested Prescriptions     Pending Prescriptions Disp Refills    SUMATRIPTAN SUCCINATE 100 MG Oral Tab [Pharmacy Med Name: SUMATRIPTAN SUCC 100 MG TABLET] 9 tablet 3     Sig: USE AT ONSET REPEAT ONCE AFTER 2 HRS-ONLY 2 IN 24 HR MAX. THIS IS A 30 DAY SUPPLY.       Last OV: 8/6/2024  Next OV: Next Appt: With Neurology (Delphine Melissa MD)12/12/2024 at 1:00 PM    [place]/;  SUMAtriptan Succinate     Dispensed Written Strength Quantity Refills Days Supply Provider Pharmacy   SUMATRIPTAN SUCC 100 MG TABLET 11/03/2024 08/06/2024  9 each  30 Delphine Melissa MD [id number] - [place]...   SUMATRIPTAN SUCC 100 MG TABLET 10/04/2024 08/06/2024  9 each  30 Delphine Melissa MD [id number] - [place]...   SUMATRIPTAN SUCC 100 MG TABLET 09/03/2024 08/06/2024  9 each  30 Delphine Melissa MD [facility]pharmacy     Last office visit plan;   Assessment  Mecca Fitzgerald is a 39 year old female with history of hypertension, who presents for evaluation of episodic migraine.  Has had a minimal response to Eletriptan but it has not successfully aborted migraines, persistently.      1. Episodic migraine  -Start sumatriptan 100 mg, higher dose may be more effective at fully aborting the headache.  Can still repeat dose within 1 hour if needed, but then take no more abortive agents of any kind until 48 to 72 hours later.  -If this is ineffective, may consider Ubrelvy or Nurtec for abortive instead     2. Abnormal MRI  MRI brain shows a few foci of T2 hyperintensity which are likely related to underlying migraines.  However, patient does have hypertension as well.  Advised her to watch hypertension, and make sure it remains under control in case some of these white matter changes are related to high blood pressure

## 2024-12-12 ENCOUNTER — OFFICE VISIT (OUTPATIENT)
Dept: NEUROLOGY | Facility: CLINIC | Age: 39
End: 2024-12-12
Payer: COMMERCIAL

## 2024-12-12 VITALS — WEIGHT: 243 LBS | HEIGHT: 63 IN | BODY MASS INDEX: 43.05 KG/M2

## 2024-12-12 DIAGNOSIS — G43.909 EPISODIC MIGRAINE: Primary | ICD-10-CM

## 2024-12-12 PROCEDURE — 99214 OFFICE O/P EST MOD 30 MIN: CPT | Performed by: INTERNAL MEDICINE

## 2024-12-12 RX ORDER — RIMEGEPANT SULFATE 75 MG/75MG
75 TABLET, ORALLY DISINTEGRATING ORAL AS NEEDED
Qty: 8 TABLET | Refills: 3 | Status: SHIPPED | OUTPATIENT
Start: 2024-12-12 | End: 2025-12-12

## 2024-12-12 NOTE — PROGRESS NOTES
formerly [facility] NEUROSCIENCES [address], [address]  [facility] [address]  [phone]            Neurology Follow Up Visit     Referred By: Dr. Day ref. provider found    Chief Complaint:   Chief Complaint   Patient presents with    Migraine     LOV 08/06/24 Pt presents with migraines, has not improved. Pt is taking    SUMATRIPTAN SUCCINATE 100 MG Oral Tab and this medication is not helping it causes her to be nauseated and lightheaded.          HPI:     Mecca Fitzgerald is a 39 year old female with history of hypertension, who presents for follow up of migraine.  After the last visit tried sumatriptan but it made her very nauseous.  It was effective for stopping the headache but still had a lingering after headache and due to the severity of the nausea she had to sleep it off.  Thinks she had about 3-4 migraines per month over the last few months.    Past Medical History:    Essential hypertension    Migraines       Past Surgical History:   Procedure Laterality Date    Other surgical history      Tonsillectomy         Social history:  History   Smoking Status    Never   Smokeless Tobacco    Never     History   Alcohol Use    Yes     History   Drug Use Unknown       Family History   Problem Relation Age of Onset    Diabetes Father     Diabetes Mother     Diabetes Maternal Grandmother     Diabetes Maternal Grandfather          Current Outpatient Medications:     Rimegepant Sulfate (NURTEC) 75 MG Oral Tablet Dispersible, Take 75 mg by mouth as needed. Take one tablet at onset of migraine.  Maximum dose in 24 hours is 1 tablet (75mg)., Disp: 8 tablet, Rfl: 3    Olmesartan Medoxomil-HCTZ 20-12.5 MG Oral Tab, Take 1 tablet by mouth daily., Disp: 90 tablet, Rfl: 3    LORazepam 0.5 MG Oral Tab, Take 1 tablet (0.5 mg total) by mouth every 6 (six) hours as needed for Anxiety., Disp: 30 tablet, Rfl: 1    LOW-OGESTREL 0.3-30 MG-MCG Oral Tab, Take 1 tablet by mouth daily., Disp: , Rfl:     No Known  Allergies    ROS:   As in HPI, the rest of the 14 system review was done and was negative      Physical Exam:  Vitals:    12/12/24 1308   Weight: 243 lb (110.2 kg)   Height: 63\"         General: No apparent distress, well nourished, well groomed.  Head- Normocephalic, atraumatic  Eyes- No redness or swelling    Neurological:   Mental Status:  Mental Status- Alert, conversant, speech fluent, following all commands, Fund of knowledge appropriate for education and age, and Thought process intact    Labs:    No results found for: \"TSH\"  Lab Results   Component Value Date    HDL 53 07/13/2024     (H) 07/13/2024    TRIG 240 (H) 07/13/2024     Lab Results   Component Value Date    HGB 15.1 07/13/2024    HCT 46.5 (H) 07/13/2024    MCV 93.6 07/13/2024    WBC 6.5 07/13/2024     07/13/2024      Lab Results   Component Value Date    BUN 12 07/13/2024    CA 9.2 07/13/2024    ALT 34 (H) 07/13/2024    AST 24 07/13/2024    ALB 4.3 07/13/2024     07/13/2024    K 4.2 07/13/2024     07/13/2024    CO2 23 07/13/2024      I have reviewed labs.    Imaging Studies:  I have independently reviewed imaging.  Few scattered T2 punctate foci hyperintensities, 1 larger one in the right frontal subcortical white matter      Assessment   Mecca Fitzgerald is a 39 year old female with history of hypertension, who presents for follow up of episodic migraine.      1. Episodic migraine  -Has failed Mar triptan and similar triptan, significant side effects with incomplete response  -Will try rimegepant Sulfate (NURTEC) 75 MG Oral Tablet Dispersible; Take 75 mg by mouth as needed. Take one tablet at onset of migraine.  Maximum dose in 24 hours is 1 tablet (75mg).  Dispense: 8 tablet; Refill: 3    Education and counseling provided to patient. Instructed patient to call my office or seek medical attention immediately if symptoms worsen.  Patient verbalized understanding of information given. All questions were answered. All  side effects of drugs were discussed.     Time spent for examination, counseling and coordination of care such as potential treatment options- 33 minutes with more than 50% of the time spent counseling the patient.    Return to clinic in: Return in about 3 months (around 3/12/2025).    Delphine Melissa MD

## 2024-12-20 ENCOUNTER — PATIENT MESSAGE (OUTPATIENT)
Dept: NEUROLOGY | Facility: CLINIC | Age: 39
End: 2024-12-20

## 2024-12-20 DIAGNOSIS — G43.909 EPISODIC MIGRAINE: ICD-10-CM

## 2024-12-23 RX ORDER — RIMEGEPANT SULFATE 75 MG/75MG
75 TABLET, ORALLY DISINTEGRATING ORAL AS NEEDED
Qty: 8 TABLET | Refills: 3 | Status: SHIPPED | OUTPATIENT
Start: 2024-12-23 | End: 2025-12-23

## 2025-02-16 DIAGNOSIS — G43.909 EPISODIC MIGRAINE: ICD-10-CM

## 2025-02-17 NOTE — TELEPHONE ENCOUNTER
The patient is requesting a refill on: Rimegepant Sulfate (NURTEC) 75 MG Oral Tablet Dispersible     [place]): Rimegepant Sulfate     Dispensed Written Strength Quantity Refills Days Supply Provider Pharmacy   NURTEC 75MG ODT TABLETS 12/23/2024 12/23/2024  8 each  18 Delphine Melissa MD [facility]     Last OV: 12/12/24  Next OV: 3/12/25  Future Appointments   Date Time Provider Department Center   3/12/2025  3:40 PM Delphine Melissa MD [facility] [place] [facility]      Assessment  Mecca Fitzgerald is a 39 year old female with history of hypertension, who presents for follow up of episodic migraine.       1. Episodic migraine  -Has failed Mar triptan and similar triptan, significant side effects with incomplete response  -Will try rimegepant Sulfate (NURTEC) 75 MG Oral Tablet Dispersible; Take 75 mg by mouth as needed. Take one tablet at onset of migraine.  Maximum dose in 24 hours is 1 tablet (75mg).  Dispense: 8 tablet; Refill: 3     Education and counseling provided to patient. Instructed patient to call my office or seek medical attention immediately if symptoms worsen.  Patient verbalized understanding of information given. All questions were answered. All side effects of drugs were discussed.      Time spent for examination, counseling and coordination of care such as potential treatment options- 33 minutes with more than 50% of the time spent counseling the patient.     Return to clinic in: Return in about 3 months (around 3/12/2025).     Delphine Melissa MD              Instructions      Return in about 3 months (around 3/12/2025).

## 2025-02-18 RX ORDER — RIMEGEPANT SULFATE 75 MG/75MG
75 TABLET, ORALLY DISINTEGRATING ORAL AS NEEDED
Qty: 8 TABLET | Refills: 3 | Status: SHIPPED | OUTPATIENT
Start: 2025-02-18 | End: 2026-02-18

## 2025-03-12 ENCOUNTER — TELEPHONE (OUTPATIENT)
Dept: NEUROLOGY | Facility: CLINIC | Age: 40
End: 2025-03-12

## 2025-03-12 ENCOUNTER — OFFICE VISIT (OUTPATIENT)
Dept: NEUROLOGY | Facility: CLINIC | Age: 40
End: 2025-03-12
Payer: COMMERCIAL

## 2025-03-12 DIAGNOSIS — G43.909 EPISODIC MIGRAINE: ICD-10-CM

## 2025-03-12 PROCEDURE — 99214 OFFICE O/P EST MOD 30 MIN: CPT | Performed by: INTERNAL MEDICINE

## 2025-03-12 RX ORDER — RIMEGEPANT SULFATE 75 MG/75MG
75 TABLET, ORALLY DISINTEGRATING ORAL AS NEEDED
Qty: 8 TABLET | Refills: 11 | Status: SHIPPED | OUTPATIENT
Start: 2025-03-12 | End: 2025-03-12

## 2025-03-12 RX ORDER — RIMEGEPANT SULFATE 75 MG/75MG
75 TABLET, ORALLY DISINTEGRATING ORAL AS NEEDED
Qty: 8 TABLET | Refills: 11 | Status: SHIPPED | OUTPATIENT
Start: 2025-03-12 | End: 2026-03-12

## 2025-06-15 NOTE — PROGRESS NOTES
[facility] [address], [facility] [address]  [facility] [address]  [phone]            Neurology Follow Up Visit     Referred By: Dr. Day ref. provider found    Chief Complaint:   Chief Complaint   Patient presents with    Migraine     LOV 12/12/2024  Pt here for follow up, discuss medications.        HPI:     Mecca Fitzgerald is a 39 year old female with history of hypertension, who presents for follow up of migraine.  After the last visit she started the Nurtec and it was very effective at stopping the migraines.  Typically would completely stop the migraine within 30 to 60 minutes.  Migraines had started to decrease in frequency once she started using the Nurtec as well.  However, after January 1 her insurance changed and she could no longer obtain the Nurtec.  Since then, migraines have worsened again.  She had 2 migraines in the last week which is a lot for her.    Past Medical History:    Essential hypertension    Migraines       Past Surgical History:   Procedure Laterality Date    Other surgical history      Tonsillectomy         Social history:  History   Smoking Status    Never   Smokeless Tobacco    Never     History   Alcohol Use    Yes     History   Drug Use Unknown       Family History   Problem Relation Age of Onset    Diabetes Father     Diabetes Mother     Diabetes Maternal Grandmother     Diabetes Maternal Grandfather          Current Outpatient Medications:     Rimegepant Sulfate (NURTEC) 75 MG Oral Tablet Dispersible, Take 75 mg by mouth as needed. Take one tablet at onset of migraine.  Maximum dose in 24 hours is 1 tablet (75mg)., Disp: 8 tablet, Rfl: 11    Olmesartan Medoxomil-HCTZ 20-12.5 MG Oral Tab, Take 1 tablet by mouth daily., Disp: 90 tablet, Rfl: 3    LORazepam 0.5 MG Oral Tab, Take 1 tablet (0.5 mg total) by mouth every 6 (six) hours as needed for Anxiety., Disp: 30 tablet, Rfl: 1    LOW-OGESTREL 0.3-30 MG-MCG Oral Tab, Take 1 tablet by mouth    Problem: Chronic Conditions and Co-morbidities  Goal: Patient's chronic conditions and co-morbidity symptoms are monitored and maintained or improved  Outcome: Progressing  Flowsheets (Taken 6/15/2025 1618)  Care Plan - Patient's Chronic Conditions and Co-Morbidity Symptoms are Monitored and Maintained or Improved:   Monitor and assess patient's chronic conditions and comorbid symptoms for stability, deterioration, or improvement   Collaborate with multidisciplinary team to address chronic and comorbid conditions and prevent exacerbation or deterioration     Problem: Discharge Planning  Goal: Discharge to home or other facility with appropriate resources  Outcome: Progressing  Flowsheets (Taken 6/15/2025 1618)  Discharge to home or other facility with appropriate resources:   Identify barriers to discharge with patient and caregiver   Arrange for needed discharge resources and transportation as appropriate   Identify discharge learning needs (meds, wound care, etc)     Problem: Pain  Goal: Verbalizes/displays adequate comfort level or baseline comfort level  Outcome: Progressing  Flowsheets (Taken 6/15/2025 1618)  Verbalizes/displays adequate comfort level or baseline comfort level:   Assess pain using appropriate pain scale   Encourage patient to monitor pain and request assistance   Administer analgesics based on type and severity of pain and evaluate response   Implement non-pharmacological measures as appropriate and evaluate response      daily., Disp: , Rfl:     No Known Allergies    ROS:   As in HPI, the rest of the 14 system review was done and was negative      Physical Exam:  There were no vitals filed for this visit.        General: No apparent distress, well nourished, well groomed.  Head- Normocephalic, atraumatic  Eyes- No redness or swelling    Neurological:   Mental Status:  Mental Status- Alert, conversant, speech fluent, following all commands, Fund of knowledge appropriate for education and age, and Thought process intact    Labs:    No results found for: \"TSH\"  Lab Results   Component Value Date    HDL 53 07/13/2024     (H) 07/13/2024    TRIG 240 (H) 07/13/2024     Lab Results   Component Value Date    HGB 15.1 07/13/2024    HCT 46.5 (H) 07/13/2024    MCV 93.6 07/13/2024    WBC 6.5 07/13/2024     07/13/2024      Lab Results   Component Value Date    BUN 12 07/13/2024    CA 9.2 07/13/2024    ALT 34 (H) 07/13/2024    AST 24 07/13/2024    ALB 4.3 07/13/2024     07/13/2024    K 4.2 07/13/2024     07/13/2024    CO2 23 07/13/2024      I have reviewed labs.    Imaging Studies:  I have independently reviewed imaging.  Few scattered T2 punctate foci hyperintensities, 1 larger one in the right frontal subcortical white matter      Assessment   Mecca Fitzgerald is a 39 year old female with history of hypertension, who presents for follow up of episodic migraine.      1. Episodic migraine  -Restart rimegepant Sulfate (NURTEC) 75 MG Oral Tablet Dispersible; Take 75 mg by mouth as needed. Take one tablet at onset of migraine.  Maximum dose in 24 hours is 1 tablet (75mg).  Dispense: 8 tablet; Refill: 3    Education and counseling provided to patient. Instructed patient to call my office or seek medical attention immediately if symptoms worsen.  Patient verbalized understanding of information given. All questions were answered. All side effects of drugs were discussed.     Time spent for examination, counseling and  coordination of care such as potential treatment options- 33 minutes with more than 50% of the time spent counseling the patient.    Return to clinic in: Return in about 4 months (around 7/12/2025).    Delphine Melissa MD

## (undated) DEVICE — Device

## (undated) DEVICE — LAWSON - CONTAINER SPCMN STL 5OZ

## (undated) DEVICE — LAWSON - WATER STL IRR PIC 1000ML

## (undated) DEVICE — LAWSON - BLADE SINUS RAD 40 ROTAT 4MM

## (undated) DEVICE — LAWSON - KIT MINOR BASIN GSA